# Patient Record
Sex: MALE | URBAN - METROPOLITAN AREA
[De-identification: names, ages, dates, MRNs, and addresses within clinical notes are randomized per-mention and may not be internally consistent; named-entity substitution may affect disease eponyms.]

---

## 2018-03-21 ENCOUNTER — IMPORTED ENCOUNTER (OUTPATIENT)
Dept: URBAN - METROPOLITAN AREA CLINIC 1 | Facility: CLINIC | Age: 28
End: 2018-03-21

## 2018-03-21 PROBLEM — H18.602: Noted: 2018-03-21

## 2018-03-21 PROCEDURE — 92004 COMPRE OPH EXAM NEW PT 1/>: CPT

## 2018-03-21 PROCEDURE — 92134 CPTRZ OPH DX IMG PST SGM RTA: CPT

## 2018-03-21 PROCEDURE — 92025 CPTRIZED CORNEAL TOPOGRAPHY: CPT

## 2018-03-21 PROCEDURE — 92015 DETERMINE REFRACTIVE STATE: CPT

## 2018-03-21 NOTE — PATIENT DISCUSSION
1.  Sudden vision loss-OS-Unknown etilogy likely secondary to refractive amblyopia (NO hx of SRx) Mac OCT was ordered and done results was normal OU 2.  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 Possible astigmatic changes. Sergio was done today3. Return for an appointment in 1 week for 10 DFE. with Dr. Yanelis Patino.

## 2018-03-30 ENCOUNTER — IMPORTED ENCOUNTER (OUTPATIENT)
Dept: URBAN - METROPOLITAN AREA CLINIC 1 | Facility: CLINIC | Age: 28
End: 2018-03-30

## 2018-03-30 PROBLEM — H53.002: Noted: 2018-03-30

## 2018-03-30 PROBLEM — H18.603: Noted: 2018-03-30

## 2018-03-30 PROBLEM — H17.822: Noted: 2018-03-30

## 2018-03-30 PROBLEM — H52.223: Noted: 2018-03-30

## 2018-03-30 PROCEDURE — 92012 INTRM OPH EXAM EST PATIENT: CPT

## 2018-03-30 NOTE — PATIENT DISCUSSION
1. Keratoconus OU- w/ Amblyopia OS. Pinhole OS showed no improvement. (-) APD. Condition discussed w/ pt. Discussed wearing a CTL for best corrected VA. Pt has no interest in CTLs. 2.  Astigmatism OU- Condition discussed w/ pt. MRX given to pt. 3.  Peripheral Corneal Scar OS- unknown etiology. Observe. 4. Return for an appointment for a 27 in 1 year with Dr. Chaz Wahl.

## 2022-04-02 ASSESSMENT — TONOMETRY
OS_IOP_MMHG: 13
OD_IOP_MMHG: 16
OS_IOP_MMHG: 15
OD_IOP_MMHG: 15

## 2022-04-02 ASSESSMENT — KERATOMETRY
OD_K1POWER_DIOPTERS: 42.25
OS_AXISANGLE2_DEGREES: 074
OS_K1POWER_DIOPTERS: 42.25
OD_K2POWER_DIOPTERS: 46.00
OD_AXISANGLE_DEGREES: 003
OS_AXISANGLE_DEGREES: 164
OD_AXISANGLE2_DEGREES: 093
OS_K2POWER_DIOPTERS: 46.00

## 2022-04-02 ASSESSMENT — VISUAL ACUITY
OD_CC: 20/50
OS_CC: CF@3'
OS_CC: CF@3'
OD_CC: 20/40